# Patient Record
Sex: MALE | Race: WHITE | NOT HISPANIC OR LATINO | Employment: FULL TIME | URBAN - METROPOLITAN AREA
[De-identification: names, ages, dates, MRNs, and addresses within clinical notes are randomized per-mention and may not be internally consistent; named-entity substitution may affect disease eponyms.]

---

## 2017-03-03 ENCOUNTER — ALLSCRIPTS OFFICE VISIT (OUTPATIENT)
Dept: OTHER | Facility: OTHER | Age: 32
End: 2017-03-03

## 2017-03-17 ENCOUNTER — ALLSCRIPTS OFFICE VISIT (OUTPATIENT)
Dept: OTHER | Facility: OTHER | Age: 32
End: 2017-03-17

## 2017-03-21 ENCOUNTER — GENERIC CONVERSION - ENCOUNTER (OUTPATIENT)
Dept: OTHER | Facility: OTHER | Age: 32
End: 2017-03-21

## 2017-04-03 ENCOUNTER — ALLSCRIPTS OFFICE VISIT (OUTPATIENT)
Dept: OTHER | Facility: OTHER | Age: 32
End: 2017-04-03

## 2017-04-17 ENCOUNTER — ALLSCRIPTS OFFICE VISIT (OUTPATIENT)
Dept: OTHER | Facility: OTHER | Age: 32
End: 2017-04-17

## 2017-04-30 ENCOUNTER — LAB CONVERSION - ENCOUNTER (OUTPATIENT)
Dept: OTHER | Facility: OTHER | Age: 32
End: 2017-04-30

## 2017-04-30 ENCOUNTER — GENERIC CONVERSION - ENCOUNTER (OUTPATIENT)
Dept: OTHER | Facility: OTHER | Age: 32
End: 2017-04-30

## 2017-04-30 LAB
A/G RATIO (HISTORICAL): 1.7 (CALC) (ref 1–2.5)
ALBUMIN SERPL BCP-MCNC: 4.1 G/DL (ref 3.6–5.1)
ALP SERPL-CCNC: 91 U/L (ref 40–115)
ALT SERPL W P-5'-P-CCNC: 20 U/L (ref 9–46)
AST SERPL W P-5'-P-CCNC: 20 U/L (ref 10–40)
BACTERIA UR QL AUTO: NORMAL /HPF
BASOPHILS # BLD AUTO: 0.6 %
BASOPHILS # BLD AUTO: 42 CELLS/UL (ref 0–200)
BILIRUB SERPL-MCNC: 0.4 MG/DL (ref 0.2–1.2)
BILIRUB UR QL STRIP: NEGATIVE
BUN SERPL-MCNC: 17 MG/DL (ref 7–25)
BUN/CREA RATIO (HISTORICAL): ABNORMAL (CALC) (ref 6–22)
CALCIUM SERPL-MCNC: 9.2 MG/DL (ref 8.6–10.3)
CHLORIDE SERPL-SCNC: 104 MMOL/L (ref 98–110)
CHOLEST SERPL-MCNC: 179 MG/DL (ref 125–200)
CHOLEST/HDLC SERPL: 6.4 (CALC)
CO2 SERPL-SCNC: 25 MMOL/L (ref 20–31)
COLOR UR: YELLOW
COMMENT (HISTORICAL): CLEAR
CREAT SERPL-MCNC: 0.96 MG/DL (ref 0.6–1.35)
DEPRECATED RDW RBC AUTO: 13.6 % (ref 11–15)
EGFR AFRICAN AMERICAN (HISTORICAL): 120 ML/MIN/1.73M2
EGFR-AMERICAN CALC (HISTORICAL): 103 ML/MIN/1.73M2
EOSINOPHIL # BLD AUTO: 322 CELLS/UL (ref 15–500)
EOSINOPHIL # BLD AUTO: 4.6 %
FECAL OCCULT BLOOD DIAGNOSTIC (HISTORICAL): NEGATIVE
GAMMA GLOBULIN (HISTORICAL): 2.4 G/DL (CALC) (ref 1.9–3.7)
GLUCOSE (HISTORICAL): 182 MG/DL (ref 65–99)
GLUCOSE (HISTORICAL): NEGATIVE
HCT VFR BLD AUTO: 43.7 % (ref 38.5–50)
HDLC SERPL-MCNC: 28 MG/DL
HGB BLD-MCNC: 14.4 G/DL (ref 13.2–17.1)
HYALINE CASTS #/AREA URNS LPF: NORMAL /LPF
KETONES UR STRIP-MCNC: NEGATIVE MG/DL
LDL CHOLESTEROL (HISTORICAL): 73 MG/DL (CALC)
LEUKOCYTE ESTERASE UR QL STRIP: NEGATIVE
LYMPHOCYTES # BLD AUTO: 1918 CELLS/UL (ref 850–3900)
LYMPHOCYTES # BLD AUTO: 27.4 %
MCH RBC QN AUTO: 28.4 PG (ref 27–33)
MCHC RBC AUTO-ENTMCNC: 33 G/DL (ref 32–36)
MCV RBC AUTO: 86.3 FL (ref 80–100)
MONOCYTES # BLD AUTO: 546 CELLS/UL (ref 200–950)
MONOCYTES (HISTORICAL): 7.8 %
NEUTROPHILS # BLD AUTO: 4172 CELLS/UL (ref 1500–7800)
NEUTROPHILS # BLD AUTO: 59.6 %
NITRITE UR QL STRIP: NEGATIVE
NON-HDL-CHOL (CHOL-HDL) (HISTORICAL): 151 MG/DL (CALC)
PH UR STRIP.AUTO: 5.5 [PH] (ref 5–8)
PLATELET # BLD AUTO: 231 THOUSAND/UL (ref 140–400)
PMV BLD AUTO: 11.3 FL (ref 7.5–12.5)
POTASSIUM SERPL-SCNC: 4.4 MMOL/L (ref 3.5–5.3)
PROT UR STRIP-MCNC: NEGATIVE MG/DL
RBC # BLD AUTO: 5.07 MILLION/UL (ref 4.2–5.8)
RBC (HISTORICAL): NORMAL /HPF
SODIUM SERPL-SCNC: 137 MMOL/L (ref 135–146)
SP GR UR STRIP.AUTO: 1.02 (ref 1–1.03)
SQUAMOUS EPITHELIAL CELLS (HISTORICAL): NORMAL /HPF
TOTAL PROTEIN (HISTORICAL): 6.5 G/DL (ref 6.1–8.1)
TRIGL SERPL-MCNC: 390 MG/DL
TSH SERPL DL<=0.05 MIU/L-ACNC: 2.01 MIU/L (ref 0.4–4.5)
WBC # BLD AUTO: 7 THOUSAND/UL (ref 3.8–10.8)
WBC # BLD AUTO: NORMAL /HPF

## 2017-05-01 ENCOUNTER — GENERIC CONVERSION - ENCOUNTER (OUTPATIENT)
Dept: OTHER | Facility: OTHER | Age: 32
End: 2017-05-01

## 2017-05-03 ENCOUNTER — LAB CONVERSION - ENCOUNTER (OUTPATIENT)
Dept: OTHER | Facility: OTHER | Age: 32
End: 2017-05-03

## 2017-05-03 LAB — TRIGL SERPL-MCNC: 87 MG/DL

## 2017-05-04 ENCOUNTER — ALLSCRIPTS OFFICE VISIT (OUTPATIENT)
Dept: OTHER | Facility: OTHER | Age: 32
End: 2017-05-04

## 2017-05-04 ENCOUNTER — LAB CONVERSION - ENCOUNTER (OUTPATIENT)
Dept: OTHER | Facility: OTHER | Age: 32
End: 2017-05-04

## 2017-05-04 LAB
HBA1C MFR BLD HPLC: 7.5 % OF TOTAL HGB
TRIGL SERPL-MCNC: 87 MG/DL

## 2017-08-29 ENCOUNTER — GENERIC CONVERSION - ENCOUNTER (OUTPATIENT)
Dept: OTHER | Facility: OTHER | Age: 32
End: 2017-08-29

## 2018-01-11 NOTE — RESULT NOTES
Discussion/Summary   good cholesterol dropped  Blood pressure is still quite high  I want him to come in for a check up to discuss  options  Verified Results  (1) COMPREHENSIVE METABOLIC PANEL 70HGZ7206 29:84PD Aadm Martinez     Test Name Result Flag Reference   GLUCOSE 182 mg/dL H 65-99   Fasting reference interval     For someone without known diabetes, a glucose  value >125 mg/dL indicates that they may have  diabetes and this should be confirmed with a  follow-up test    UREA NITROGEN (BUN) 17 mg/dL  7-25   CREATININE 0 96 mg/dL  0 60-1 35   eGFR NON-AFR   AMERICAN 103 mL/min/1 73m2  > OR = 60   eGFR AFRICAN AMERICAN 120 mL/min/1 73m2  > OR = 60   BUN/CREATININE RATIO   9-65   NOT APPLICABLE (calc)   SODIUM 137 mmol/L  135-146   POTASSIUM 4 4 mmol/L  3 5-5 3   CHLORIDE 104 mmol/L     CARBON DIOXIDE 25 mmol/L  20-31   CALCIUM 9 2 mg/dL  8 6-10 3   PROTEIN, TOTAL 6 5 g/dL  6 1-8 1   ALBUMIN 4 1 g/dL  3 6-5 1   GLOBULIN 2 4 g/dL (calc)  1 9-3 7   ALBUMIN/GLOBULIN RATIO 1 7 (calc)  1 0-2 5   BILIRUBIN, TOTAL 0 4 mg/dL  0 2-1 2   ALKALINE PHOSPHATASE 91 U/L     AST 20 U/L  10-40   ALT 20 U/L  9-46     (1) CBC/PLT/DIFF 29Apr2017 09:21AM Adam Martinez     Test Name Result Flag Reference   WHITE BLOOD CELL COUNT 7 0 Thousand/uL  3 8-10 8   RED BLOOD CELL COUNT 5 07 Million/uL  4 20-5 80   HEMOGLOBIN 14 4 g/dL  13 2-17 1   HEMATOCRIT 43 7 %  38 5-50 0   MCV 86 3 fL  80 0-100 0   MCH 28 4 pg  27 0-33 0   MCHC 33 0 g/dL  32 0-36 0   RDW 13 6 %  11 0-15 0   PLATELET COUNT 061 Thousand/uL  140-400   ABSOLUTE NEUTROPHILS 4172 cells/uL  4662-7062   ABSOLUTE LYMPHOCYTES 1918 cells/uL  850-3900   ABSOLUTE MONOCYTES 546 cells/uL  200-950   ABSOLUTE EOSINOPHILS 322 cells/uL     ABSOLUTE BASOPHILS 42 cells/uL  0-200   NEUTROPHILS 59 6 %     LYMPHOCYTES 27 4 %     MONOCYTES 7 8 %     EOSINOPHILS 4 6 %     BASOPHILS 0 6 %     MPV 11 3 fL  7 5-12 5     (1) URINALYSIS (will reflex a microscopy if leukocytes,

## 2018-01-13 VITALS — SYSTOLIC BLOOD PRESSURE: 130 MMHG | DIASTOLIC BLOOD PRESSURE: 90 MMHG

## 2018-01-13 NOTE — PROGRESS NOTES
Chief Complaint  Here for BP check-142/378-FNX-ptdtfir-large cuff      Active Problems    1  Hyperlipidemia (272 4) (E78 5)   2  Hypertension (401 9) (I10)   3  Lumbar radiculopathy (724 4) (M54 16)   4  Obesity (278 00) (E66 9)   5  Type 1 diabetes mellitus without complication (931 09) (K88 6)   6  Type I diabetes mellitus, uncontrolled (250 03) (E10 65)    Current Meds   1  CloNIDine HCl - 0 1 MG Oral Tablet; take 2 tablet twice daily; Therapy: 26KUK6851 to (Evaluate:01Aug2017)  Requested for: 80PEN8199 Recorded   2  Dexcom G4 Platinum Sensor Kit KIT; Therapy: 35CII6854 to (Evaluate:18Jun2015) Recorded   3  HumaLOG 100 UNIT/ML Subcutaneous Solution Recorded   4  OneTouch Verio In [employer]; Check 4-6 times daily; Therapy: 83QMF8284 to (Evaluate:20Mar2016)  Requested for: 15FBZ6535; Last   Rx:26Mar2015 Ordered   5  Simvastatin 20 MG Oral Tablet; Take 1 tablet daily; Therapy: 78AEX4167 to (Last Rx:20Kem3952)  Requested for: 42VKU5896 Ordered   6  Valsartan-Hydrochlorothiazide 320-25 MG Oral Tablet; Take 1 tablet daily; Therapy: 67HPP0135 to (Evaluate:14Nca5577)  Requested for: 08FSO0999; Last   Rx:03Mar2017 Ordered    Allergies    1  Sulfa Drugs    Vitals  Signs    Systolic: 098, LUE, Sitting  Diastolic: 285, LUE, Sitting    Assessment  Increase clonidine to 0 1 mg Ã2 tablets twice a day  Nursing visit in 2 weeks to recheck the blood pressure  Future Appointments    Date/Time Provider Specialty Site   09/08/2017 09:30 AM Adnrew Segura DO [facility]     Signatures   Electronically signed by : Hanh Jarvis DO;  Apr  3 2017  3:30PM EST                       (Author)

## 2018-01-14 VITALS — SYSTOLIC BLOOD PRESSURE: 142 MMHG | DIASTOLIC BLOOD PRESSURE: 100 MMHG

## 2018-01-14 VITALS
DIASTOLIC BLOOD PRESSURE: 84 MMHG | SYSTOLIC BLOOD PRESSURE: 140 MMHG | BODY MASS INDEX: 38.41 KG/M2 | WEIGHT: 275.38 LBS

## 2018-01-14 VITALS
DIASTOLIC BLOOD PRESSURE: 84 MMHG | WEIGHT: 271.25 LBS | BODY MASS INDEX: 37.83 KG/M2 | SYSTOLIC BLOOD PRESSURE: 134 MMHG

## 2018-01-16 NOTE — PROGRESS NOTES
Chief Complaint  Here for BP check-130/74-QEZ-ezedkxq -large cuff      Active Problems    1  Hyperlipidemia (272 4) (E78 5)   2  Hypertension (401 9) (I10)   3  Lumbar radiculopathy (724 4) (M54 16)   4  Obesity (278 00) (E66 9)   5  Type 1 diabetes mellitus without complication (035 50) (Z64 0)   6  Type I diabetes mellitus, uncontrolled (250 03) (E10 65)    Current Meds   1  CloNIDine HCl - 0 1 MG Oral Tablet; TAKE 1 TABLET 3 times daily; Therapy: 97ERO5541 to [id number])  Requested for: 72MVK0174 Recorded   2  Dexcom G4 Platinum Sensor Kit KIT; Therapy: 98YEP7082 to (Evaluate:18Jun2015) Recorded   3  HumaLOG 100 UNIT/ML Subcutaneous Solution Recorded   4  OneTouch Verio In [employer]; Check 4-6 times daily; Therapy: 85CUJ2198 to (Evaluate:20Mar2016)  Requested for: 80RXE4170; Last   Rx:26Mar2015 Ordered   5  Simvastatin 20 MG Oral Tablet; Take 1 tablet daily; Therapy: 30EUV4154 to (Last Rx:52Mcp6443)  Requested for: 61PJM3787 Ordered   6  Valsartan-Hydrochlorothiazide 320-25 MG Oral Tablet; Take 1 tablet daily; Therapy: 25WBF6480 to (Evaluate:16Dot8948)  Requested for: 06XSC4025; Last   Rx:03Mar2017 Ordered    Allergies    1  Sulfa Drugs    Vitals  Signs    Systolic: 624, LUE, Sitting  Diastolic: 90, LUE, Sitting    Plan  increase to tid clonidine       Future Appointments    Date/Time Provider Specialty Site   09/08/2017 09:30 AM Saskia Alvarenga DO Family Medicine [facility]     Signatures   Electronically signed by : Richar Brink DO; Mar 17 2017 10:29AM EST                       (Author)

## 2018-01-17 NOTE — PROGRESS NOTES
Chief Complaint  HERE TODAY FOR BP CHECK- RIGHT ARM- 160/100      Active Problems    1  Diarrhea (787 91) (R19 7)   2  Hyperlipidemia (272 4) (E78 5)   3  Hypertension (401 9) (I10)   4  Lumbar radiculopathy (724 4) (M54 16)   5  Obesity (278 00) (E66 9)   6  Type 1 diabetes mellitus without complication (722 95) (I70 5)   7  Type I diabetes mellitus, uncontrolled (250 03) (E10 65)    Current Meds   1  CloNIDine HCl - 0 1 MG Oral Tablet; take 2 tablet twice daily; Therapy: 53AGC0538 to (Evaluate:90Mru4633)  Requested for: 03Apr2017 Recorded   2  Dexcom G4 Platinum Sensor Kit KIT; Therapy: 96APW4940 to (Evaluate:18Jun2015) Recorded   3  Diphenoxylate-Atropine 2 5-0 025 MG Oral Tablet; TAKE 1 TABLET 4 TIMES DAILY AS   NEEDED FOR DIARRHEA; Therapy: 35Xwq9786 to (Evaluate:19Apr2017); Last Rx:14Apr2017 Ordered   4  HumaLOG 100 UNIT/ML Subcutaneous Solution Recorded   5  OneTouch Verio In [employer]; Check 4-6 times daily; Therapy: 01KHR9237 to (Evaluate:20Mar2016)  Requested for: 74RDA8411; Last   Rx:26Mar2015 Ordered   6  Simvastatin 20 MG Oral Tablet; Take 1 tablet daily; Therapy: 10UJG1101 to (Last Rx:25Fiv4081)  Requested for: 79SUQ1876 Ordered   7  Valsartan-Hydrochlorothiazide 320-25 MG Oral Tablet; Take 1 tablet daily; Therapy: 97QVL9611 to (Evaluate:30Pse1929)  Requested for: 43NBU2834; Last   Rx:03Mar2017 Ordered    Allergies    1  Sulfa Drugs    Vitals  Signs    Systolic: 016  Diastolic: 189    Future Appointments    Date/Time Provider Specialty Site   09/08/2017 09:30 AM Concepcion Galeano DO Family Medicine [facility]     Signatures   Electronically signed by : Alayna Worthy DO;  Apr 17 2017  1:49PM EST                       (Author)

## 2018-01-22 VITALS — DIASTOLIC BLOOD PRESSURE: 100 MMHG | SYSTOLIC BLOOD PRESSURE: 160 MMHG

## 2018-02-26 DIAGNOSIS — I10 ESSENTIAL HYPERTENSION: Primary | ICD-10-CM

## 2018-02-26 RX ORDER — VALSARTAN AND HYDROCHLOROTHIAZIDE 320; 25 MG/1; MG/1
TABLET, FILM COATED ORAL
Qty: 90 TABLET | Refills: 1 | Status: SHIPPED | OUTPATIENT
Start: 2018-02-26 | End: 2022-03-22

## 2019-05-06 DIAGNOSIS — I10 ESSENTIAL HYPERTENSION: ICD-10-CM

## 2019-05-06 RX ORDER — VALSARTAN AND HYDROCHLOROTHIAZIDE 320; 25 MG/1; MG/1
TABLET, FILM COATED ORAL
Qty: 90 TABLET | Refills: 1 | OUTPATIENT
Start: 2019-05-06

## 2020-07-04 DIAGNOSIS — E10.9 TYPE 1 DIABETES MELLITUS WITHOUT COMPLICATION (HCC): Primary | ICD-10-CM

## 2020-10-06 DIAGNOSIS — R05.9 COUGH: Primary | ICD-10-CM

## 2020-11-25 DIAGNOSIS — R05.9 COUGH: ICD-10-CM

## 2020-11-25 PROCEDURE — U0003 INFECTIOUS AGENT DETECTION BY NUCLEIC ACID (DNA OR RNA); SEVERE ACUTE RESPIRATORY SYNDROME CORONAVIRUS 2 (SARS-COV-2) (CORONAVIRUS DISEASE [COVID-19]), AMPLIFIED PROBE TECHNIQUE, MAKING USE OF HIGH THROUGHPUT TECHNOLOGIES AS DESCRIBED BY CMS-2020-01-R: HCPCS | Performed by: NURSE PRACTITIONER

## 2020-11-27 LAB — SARS-COV-2 RNA SPEC QL NAA+PROBE: NOT DETECTED

## 2022-03-22 ENCOUNTER — OFFICE VISIT (OUTPATIENT)
Dept: UROLOGY | Facility: CLINIC | Age: 37
End: 2022-03-22
Payer: COMMERCIAL

## 2022-03-22 VITALS
SYSTOLIC BLOOD PRESSURE: 140 MMHG | WEIGHT: 259 LBS | HEIGHT: 72 IN | HEART RATE: 56 BPM | DIASTOLIC BLOOD PRESSURE: 80 MMHG | BODY MASS INDEX: 35.08 KG/M2

## 2022-03-22 DIAGNOSIS — Z30.2 ENCOUNTER FOR STERILIZATION: Primary | ICD-10-CM

## 2022-03-22 PROCEDURE — 99204 OFFICE O/P NEW MOD 45 MIN: CPT | Performed by: PHYSICIAN ASSISTANT

## 2022-03-22 RX ORDER — ROSUVASTATIN CALCIUM 20 MG/1
TABLET, COATED ORAL
COMMUNITY
Start: 2022-02-28

## 2022-03-22 RX ORDER — LOSARTAN POTASSIUM AND HYDROCHLOROTHIAZIDE 25; 100 MG/1; MG/1
TABLET ORAL
COMMUNITY
Start: 2022-02-28

## 2022-03-22 RX ORDER — NEBIVOLOL 5 MG/1
5 TABLET ORAL DAILY
COMMUNITY
Start: 2021-12-27

## 2022-03-22 RX ORDER — HYDRALAZINE HYDROCHLORIDE 50 MG/1
TABLET, FILM COATED ORAL
COMMUNITY
Start: 2022-02-05

## 2022-03-22 RX ORDER — ALPRAZOLAM 2 MG/1
TABLET ORAL
Qty: 1 TABLET | Refills: 0 | Status: SHIPPED | OUTPATIENT
Start: 2022-03-22

## 2022-03-22 RX ORDER — BLOOD SUGAR DIAGNOSTIC
STRIP MISCELLANEOUS
COMMUNITY
Start: 2022-02-12

## 2022-03-22 NOTE — PROGRESS NOTES
3/22/2022      Chief Complaint   Patient presents with    Vascular Consult         Assessment and Plan    43 y o  male managed by new patient    1  Desire for elective sterilization  - exam today as below  - informed consent signed today  - continue contraception  - rx xanax with  to/from on appt date    Return for vasectomy as scheduled  History of Present Illness  Saroj Amado is a 43 y o  male here for evaluation of VASECTOMY CONSULT    No history of genitourinary or groin trauma or surgery- no  Fathered children-2 yr and 5yr  In relationship, mutual desire for permanent sterility- yes  Current contraceptive method- wife has IUD  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  Voiding issues- none  Bleeding issues/thinners- none  Allergies to lidocaine/marcaine/betadine/chromic- none  Type 1 diabetic on insulin pump well controlled, A1c 7 4; no wound/skin issues    The patient presents requesting elective sterilization vasectomy  We discussed that vasectomy is in operation performed in the office in order to provide elective sterilization  This procedure should be considered a permanent option  Although there are subspecialists who perform vasectomy reversals, these operations are not 100% successful and are often not covered by insurance meaning they can come with a large out-of-pocket cost  The patient understands this  We reviewed the procedure in depth  Risk and benefits of the procedure were discussed and reviewed  Informed consent was obtained in the office today  The patient was prescribed a benzodiazepine to take one hour prior to the procedure to assist with his comfort  He understands that he will require transportation to and from the office that day if he is to use the benzodiazepine  He also understands he will require  semen analysis testing at 8 weeks post procedure to ensure full sterilization    In the interim, he will require contraception during intercourse to avoid an undesired pregnancy  Usually, patients are out of work for 2-3 days  We recommend tight fitting scrotal support following the procedure along with ice packs applied to the scrotum 15 minutes on and 15 minutes off for the first 24 hours  We discussed that we do send the patient home with short course of anti-inflammatory and/or narcotic pain medication  After this discussion, the patient agrees to proceed  We will schedule him in the near future  He agrees to take oral sedative - xanax 2mg one hour prior to procedure  Review of Systems   Constitutional: Negative  Respiratory: Negative  Cardiovascular: Negative  Genitourinary: Negative for decreased urine volume, difficulty urinating, dysuria, flank pain, frequency, hematuria, scrotal swelling, testicular pain and urgency  Musculoskeletal: Negative              AUA SYMPTOM SCORE      Most Recent Value   AUA SYMPTOM SCORE    How often have you had a sensation of not emptying your bladder completely after you finished urinating? 0 (P)     How often have you had to urinate again less than two hours after you finished urinating? 2 (P)     How often have you found you stopped and started again several times when you urinate? 0 (P)     How often have you found it difficult to postpone urination? 1 (P)     How often have you had a weak urinary stream? 0 (P)     How often have you had to push or strain to begin urination? 0 (P)     How many times did you most typically get up to urinate from the time you went to bed at night until the time you got up in the morning? 0 (P)     Quality of Life: If you were to spend the rest of your life with your urinary condition just the way it is now, how would you feel about that? 1 (P)     AUA SYMPTOM SCORE 3 (P)            Vitals  Vitals:    03/22/22 1157   BP: 140/80   Pulse: 56   Weight: 117 kg (259 lb)   Height: 6' (1 829 m)       Physical Exam    General: Well appearing, no distress, appears stated age  HEENT:  Normocephalic, atraumatic  Conjunctiva clear  Respiratory: Nonlabored respirations, no wheeze or cough  Abdomen:  Soft nontender without hernia  No suprapubic or CVA tenderness  Genitourinary: Circumcised penis, normal phallus, orthotopic patent meatus  Testes smooth descended bilaterally into the scrotum nontender with no palpable mass  Palpably normal spermatic cord and vas deferens bilaterally  Musculoskeletal:  Normal range of motion and gait without defecit  Neuro: No gross neurologic defect or abnormality  Steady unassisted gait  Speech and affect normal   Dermatologic: skin warm, dry; no rash erythema or ecchymosis      Past History  Past Medical History:   Diagnosis Date    Diabetes mellitus ([facility] )     type 1    Hypercholesteremia     Hypertension      Social History     Socioeconomic History    Marital status: /Civil Union     Spouse name: None    Number of children: None    Years of education: None    Highest education level: None   Occupational History    None   Tobacco Use    Smoking status: Never Smoker    Smokeless tobacco: Never Used   Substance and Sexual Activity    Alcohol use: Yes    Drug use: Never    Sexual activity: None   Other Topics Concern    None   Social History Narrative    None     Social Determinants of Health     Financial Resource Strain: Not on file   Food Insecurity: Not on file   Transportation Needs: Not on file   Physical Activity: Not on file   Stress: Not on file   Social Connections: Not on file   Intimate Partner Violence: Not on file   Housing Stability: Not on file     Social History     Tobacco Use   Smoking Status Never Smoker   Smokeless Tobacco Never Used     History reviewed  No pertinent family history      The following portions of the patient's history were reviewed and updated as appropriate: allergies, current medications, past medical history, past social history, past surgical history and problem list     Results  No results found for this or any previous visit (from the past 1 hour(s))  ]  No results found for: PSA  Lab Results   Component Value Date    GLUCOSE 177 (H) 11/25/2015    CALCIUM 9 2 04/29/2017     04/29/2017    K 4 4 04/29/2017    CO2 25 04/29/2017     04/29/2017    BUN 17 04/29/2017    CREATININE 0 96 04/29/2017     Lab Results   Component Value Date    WBC 7 0 04/29/2017    WBC NONE SEEN 04/29/2017    HGB 14 4 04/29/2017    HCT 43 7 04/29/2017    MCV 86 3 04/29/2017     04/29/2017

## 2022-06-03 ENCOUNTER — TELEPHONE (OUTPATIENT)
Dept: UROLOGY | Facility: CLINIC | Age: 37
End: 2022-06-03

## 2022-07-12 NOTE — TELEPHONE ENCOUNTER
Pt called the office stated that the new appt date does not work for him and he would like to reschedule  Please advise

## 2022-08-17 ENCOUNTER — TELEPHONE (OUTPATIENT)
Dept: UROLOGY | Facility: CLINIC | Age: 37
End: 2022-08-17

## 2022-08-17 NOTE — TELEPHONE ENCOUNTER
Left message for patient that we need to change the time of his Vasectomy on 9/9/22 with Dr Dorita Miller from 2:45 to a 10:45 arrival    Letter sent to patient as well, asked patient to call back to confirm time change

## 2022-10-19 ENCOUNTER — TELEPHONE (OUTPATIENT)
Dept: UROLOGY | Facility: CLINIC | Age: 37
End: 2022-10-19

## 2022-10-19 NOTE — TELEPHONE ENCOUNTER
Called patient to let him know dr Heriberto Garcia will be out of the office 10/20/22 he will call back to reschedule

## 2022-12-02 DIAGNOSIS — E13.69 OTHER SPECIFIED DIABETES MELLITUS WITH OTHER SPECIFIED COMPLICATION, UNSPECIFIED WHETHER LONG TERM INSULIN USE (HCC): Primary | ICD-10-CM

## 2022-12-02 RX ORDER — GLUCAGON 3 MG/1
1 POWDER NASAL AS NEEDED
Qty: 1 EACH | Refills: 0 | Status: SHIPPED | OUTPATIENT
Start: 2022-12-02

## 2023-01-17 ENCOUNTER — APPOINTMENT (OUTPATIENT)
Dept: RADIOLOGY | Facility: MEDICAL CENTER | Age: 38
End: 2023-01-17

## 2023-01-17 ENCOUNTER — TELEPHONE (OUTPATIENT)
Dept: URGENT CARE | Facility: MEDICAL CENTER | Age: 38
End: 2023-01-17

## 2023-01-17 ENCOUNTER — OFFICE VISIT (OUTPATIENT)
Dept: URGENT CARE | Facility: MEDICAL CENTER | Age: 38
End: 2023-01-17

## 2023-01-17 VITALS
RESPIRATION RATE: 16 BRPM | DIASTOLIC BLOOD PRESSURE: 94 MMHG | BODY MASS INDEX: 35.89 KG/M2 | WEIGHT: 265 LBS | SYSTOLIC BLOOD PRESSURE: 150 MMHG | OXYGEN SATURATION: 96 % | HEART RATE: 72 BPM | TEMPERATURE: 98.6 F | HEIGHT: 72 IN

## 2023-01-17 DIAGNOSIS — R53.83 OTHER FATIGUE: ICD-10-CM

## 2023-01-17 DIAGNOSIS — R07.81 RIB PAIN ON LEFT SIDE: ICD-10-CM

## 2023-01-17 DIAGNOSIS — R07.81 RIB PAIN ON LEFT SIDE: Primary | ICD-10-CM

## 2023-01-17 NOTE — PATIENT INSTRUCTIONS
Take Motrin 600 mg three times daily with food as needed for the next 5-7 days  Supplement with Tylenol 1,000 mg every 8 hours as needed, alternating every 4 hours  Heat 20 minutes followed immediately by ice for 20 minutes  Lidoderm patches as needed  Can be obtained over-the-counter  Do not use heat or ice over patch  Symptoms do not improve in 2 to 3 days follow-up with PCP  If symptoms worsen or new symptoms develop report to the emergency room immediately

## 2023-01-17 NOTE — PROGRESS NOTES
[address]        NAME: Danial Gauthier is a 42 y o  male  : 1985    MRN: [id number]  DATE: 2023  TIME: 9:14 AM    Assessment and Plan   Rib pain on left side [R07 81]  1  Rib pain on left side  XR ribs left w pa chest min 3 views      2  Other fatigue        Pt with left-sided rib pain, TTP overteh region of the 11th rib  Recommend x-ray for further evaluation  X-ray demonstrates what appears to be either the 10th, 11th or 12th rib consistent with possible fracture  Radiology read pending  Pt instructed in OTC analgesics, heat, ice, and OTC lidocaine patches  Pt is reproducible with palpation and movement per patient report and cardiopulmonary or internal organ cause is unlikely  Patient Instructions   There are no Patient Instructions on file for this visit  Follow up with PCP in 3-5 days  Proceed to  ER if symptoms worsen  Chief Complaint     Chief Complaint   Patient presents with   • Cold Like Symptoms     Pt states that on  he was working on cabinets  Felt a pop in L side and since that he has been having intermittent chills and increasing fatigue  States he just feels like hes getting sick  Denies fevers  COVID test at home negative this past   History of Present Illness       42year old male presents with complaint of left lower rib pain  Patient reports that he was laying in a cabinet when he felt a pop  Patient reports that he has had worsening fatigue since that time and also notes intermittent hot and cold flashes  Patient denies fever, chest pain, nausea, vomiting, diarrhea, runny nose, congestion, and cough  He had negative COVID test at home on   Patient reports intermittent episodes of diaphoresis  Pt has had rib fractures in the past and states that these symptoms are not similar to that  Pt reports pain is worse when crossing his left leg across the right and getting in and out of the car   Better at rest        Review of Systems   Review of Systems   Constitutional: Positive for chills and fatigue  Negative for fever  HENT: Negative for congestion, postnasal drip, rhinorrhea, sore throat, trouble swallowing and voice change  Respiratory: Negative for cough and shortness of breath  Cardiovascular: Positive for chest pain (left rib)  Gastrointestinal: Negative for diarrhea, nausea and vomiting  Musculoskeletal: Negative for myalgias  Neurological: Negative for headaches  Current Medications       Current Outpatient Medications:   •  Glucagon (Baqsimi Two Pack) 3 MG/DOSE POWD, 1 application into each nostril as needed (hypoglycemia), Disp: 1 each, Rfl: 0  •  glucagon 1 MG injection, 1 mg, Disp: , Rfl:   •  HumaLOG 100 UNIT/ML injection, INJECT UP TO 75 UNITS PER DAY VIA INSULIN PUMP, Disp: , Rfl:   •  hydrALAZINE (APRESOLINE) 50 mg tablet, , Disp: , Rfl:   •  losartan-hydrochlorothiazide (HYZAAR) 100-25 MG per tablet, , Disp: , Rfl:   •  nebivolol (BYSTOLIC) 5 mg tablet, Take 5 mg by mouth daily, Disp: , Rfl:   •  rosuvastatin (CRESTOR) 20 MG tablet, , Disp: , Rfl:   •  ALPRAZolam (XANAX) 2 MG tablet, Take one tablet by mouth one hour prior to vasectomy  Need  to/from appt   (Patient not taking: Reported on 1/17/2023), Disp: 1 tablet, Rfl: 0  •  Contour Next Test test strip, USE TO CHECK BLOOD SUGAR 8 TIMES DAILY, Disp: , Rfl:     Current Allergies     Allergies as of 01/17/2023 - Reviewed 01/17/2023   Allergen Reaction Noted   • Ace inhibitors Cough 10/12/2017   • Amlodipine Swelling 10/12/2017   • Sulfa antibiotics Other (See Comments) 03/22/2022            The following portions of the patient's history were reviewed and updated as appropriate: allergies, current medications, past family history, past medical history, past social history, past surgical history and problem list      Past Medical History:   Diagnosis Date   • Diabetes mellitus ([address] )     type 1   • Hypercholesteremia    • Hypertension Past Surgical History:   Procedure Laterality Date   • NO PAST SURGERIES         History reviewed  No pertinent family history  Medications have been verified  Objective   /94 (BP Location: Right arm, Patient Position: Sitting, Cuff Size: Large)   Pulse 72   Temp 98 6 °F (37 °C) (Tympanic)   Resp 16   Ht 6' (1 829 m)   Wt 120 kg (265 lb)   SpO2 96%   BMI 35 94 kg/m²   No LMP for male patient  Physical Exam     Physical Exam  Vitals and nursing note reviewed  Constitutional:       General: He is awake  He is not in acute distress  Appearance: Normal appearance  He is well-developed and well-groomed  He is not ill-appearing, toxic-appearing or diaphoretic  HENT:      Head: Normocephalic and atraumatic  Right Ear: Hearing and external ear normal       Left Ear: Hearing and external ear normal    Eyes:      General: Lids are normal  Vision grossly intact  Gaze aligned appropriately  Cardiovascular:      Rate and Rhythm: Normal rate and regular rhythm  Heart sounds: Normal heart sounds, S1 normal and S2 normal  Heart sounds not distant  No murmur heard  No friction rub  No gallop  Pulmonary:      Effort: Pulmonary effort is normal       Breath sounds: Normal breath sounds  No decreased breath sounds, wheezing, rhonchi or rales  Comments: Patient is speaking in full sentences with no increased respiratory effort  No audible wheezing or stridor  Chest:      Comments: Pt is tender to palpation over the region of the 11th rib  Musculoskeletal:      Cervical back: Normal range of motion  Skin:     General: Skin is warm and dry  Neurological:      Mental Status: He is alert and oriented to person, place, and time  Coordination: Coordination is intact  Gait: Gait is intact     Psychiatric:         Attention and Perception: Attention and perception normal          Mood and Affect: Mood and affect normal          Speech: Speech normal  Behavior: Behavior normal  Behavior is cooperative  Note: Portions of this record may have been created with voice recognition software  Occasional wrong word or "sound a like" substitutions may have occurred due to the inherent limitations of voice recognition software  Please read the chart carefully and recognize, using context, where substitutions have occurred  *